# Patient Record
Sex: MALE | URBAN - METROPOLITAN AREA
[De-identification: names, ages, dates, MRNs, and addresses within clinical notes are randomized per-mention and may not be internally consistent; named-entity substitution may affect disease eponyms.]

---
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
## 2024-02-10 NOTE — PROCEDURE: TREATMENT REGIMEN
- - -
Start Regimen: Veltin-QHS, Onexton-QAM, Doxycycline 150mg-QAM, Panoxyl-QD
Hide Cetaphil Products: No
Action 2: Continue
Detail Level: Zone
Continue Regimen: Fabior-QD, Clindamycin Phosphate Swab-BID
Discontinue Regimen: Epiduo Forte
Continue Regimen: Veltin as directed, Clindamycin as directed, Fabior foam as directed, Onexton as directed, Panoxyl cleanser as directed.
Other Instructions: Continue doxycycline as directed for 2 weeks. Then decrease doxycycline to every other day for 2 more weeks, then stop.